# Patient Record
Sex: FEMALE | Race: WHITE | Employment: OTHER | URBAN - METROPOLITAN AREA
[De-identification: names, ages, dates, MRNs, and addresses within clinical notes are randomized per-mention and may not be internally consistent; named-entity substitution may affect disease eponyms.]

---

## 2022-12-21 RX ORDER — FAMOTIDINE 20 MG/1
20 TABLET, FILM COATED ORAL DAILY
COMMUNITY

## 2022-12-21 RX ORDER — ALBUTEROL SULFATE 90 UG/1
2 AEROSOL, METERED RESPIRATORY (INHALATION) EVERY 4 HOURS PRN
COMMUNITY
Start: 2018-02-06

## 2022-12-21 RX ORDER — POTASSIUM CHLORIDE 20 MEQ/1
20 TABLET, EXTENDED RELEASE ORAL 2 TIMES DAILY
COMMUNITY
Start: 2022-12-08 | End: 2023-12-08

## 2022-12-21 RX ORDER — LORATADINE 10 MG/1
10 TABLET ORAL PRN
COMMUNITY
Start: 2019-06-24

## 2022-12-21 RX ORDER — ATORVASTATIN CALCIUM 80 MG/1
80 TABLET, FILM COATED ORAL NIGHTLY
COMMUNITY
Start: 2022-09-02

## 2022-12-21 RX ORDER — AMLODIPINE BESYLATE 5 MG/1
5 TABLET ORAL DAILY
COMMUNITY
Start: 2022-04-07 | End: 2023-04-07

## 2022-12-21 RX ORDER — METOPROLOL SUCCINATE 25 MG/1
25 TABLET, EXTENDED RELEASE ORAL NIGHTLY
COMMUNITY
Start: 2022-04-07 | End: 2023-04-07

## 2022-12-21 RX ORDER — FEXOFENADINE HCL 180 MG/1
180 TABLET ORAL DAILY
COMMUNITY

## 2022-12-21 NOTE — PERIOP NOTE
The following records have been requested from Dr. Crystal Lowe:       [address] Records    Please send most recent pacemaker interrogation, EKG, ECHO, Stress, and cath report via fax to [phone]. Thank you!

## 2022-12-21 NOTE — PERIOP NOTE
Patient verified name, , and procedure. Type: 1a; abbreviated assessment per anesthesia guidelines  Labs per surgeon: No orders. Labs per anesthesia: None. Pt has [employer] pacemaker which was placed 22. VM left with [employer] Rep, Michael Hdz, informing that pt is schedule for an EGD at [facility] on 22, time to be determined by pre-op the day before surgery. Bo instructed to call pre-op at [phone] for time of arrival. Bo's contact #[phone]. Pacemaker added to case posting. Cardiology office note (22) in EHR if needed. EKG tracing, echo, stress, and pacemaker interrogation requested, via [address], from [facility] office (AYF:[phone]). Records to be faxed to [phone]. Charge nurse to f/u. Instructed pt that they will be notified by the Gi Lab for time of arrival. If any questions please call the GI lab at [phone]. Follow diet and prep instructions per office. May have clear liquids until 4 hours prior to time of arrival.    [place] Roxo or shower the night before and the am of surgery with antibacterial soap. No lotions, oils, powders, cologne on skin. No make up, eye make up or jewelry. Wear loose fitting comfortable, clean clothing. Must have adult present in building the entire time . Medications for the day of procedure inhaler PRN-instructed to bring DOS, Allegra PRN, Amlodipine, and Pepcid. Pt instructed to follow surgeon's instructions regarding Eliquis. The following discharge instructions reviewed with patient: medication given during procedure may cause drowsiness for several hours, therefore, do not drive or operate machinery for remainder of the day, no alcohol on the day of your procedure, resume regular diet and activity unless otherwise directed, for mild sore throat you may use Cepacol throat lozenges or warm salt water gargles as needed, call your physician for any problems or questions. Patient verbalizes understanding.

## 2022-12-22 ENCOUNTER — ANESTHESIA EVENT (OUTPATIENT)
Dept: ENDOSCOPY | Age: 82
End: 2022-12-22
Payer: MEDICARE

## 2022-12-23 ENCOUNTER — ANESTHESIA (OUTPATIENT)
Dept: ENDOSCOPY | Age: 82
End: 2022-12-23
Payer: MEDICARE

## 2022-12-23 ENCOUNTER — HOSPITAL ENCOUNTER (OUTPATIENT)
Age: 82
Setting detail: OUTPATIENT SURGERY
Discharge: HOME | End: 2022-12-23
Attending: INTERNAL MEDICINE | Admitting: INTERNAL MEDICINE
Payer: MEDICARE

## 2022-12-23 VITALS
DIASTOLIC BLOOD PRESSURE: 75 MMHG | WEIGHT: 143.96 LBS | SYSTOLIC BLOOD PRESSURE: 159 MMHG | HEART RATE: 81 BPM | HEIGHT: 67 IN | TEMPERATURE: 98.6 F | OXYGEN SATURATION: 96 % | RESPIRATION RATE: 16 BRPM | BODY MASS INDEX: 22.6 KG/M2

## 2022-12-23 PROCEDURE — 3700000000 HC ANESTHESIA ATTENDED CARE: Performed by: INTERNAL MEDICINE

## 2022-12-23 PROCEDURE — 6360000002 HC RX W HCPCS: Performed by: NURSE ANESTHETIST, CERTIFIED REGISTERED

## 2022-12-23 PROCEDURE — 2709999900 HC NON-CHARGEABLE SUPPLY: Performed by: INTERNAL MEDICINE

## 2022-12-23 PROCEDURE — 2500000003 HC RX 250 WO HCPCS: Performed by: NURSE ANESTHETIST, CERTIFIED REGISTERED

## 2022-12-23 PROCEDURE — 2580000003 HC RX 258: Performed by: INTERNAL MEDICINE

## 2022-12-23 PROCEDURE — 3609012700 HC EGD DILATION SAVORY: Performed by: INTERNAL MEDICINE

## 2022-12-23 PROCEDURE — 7100000010 HC PHASE II RECOVERY - FIRST 15 MIN: Performed by: INTERNAL MEDICINE

## 2022-12-23 PROCEDURE — 7100000011 HC PHASE II RECOVERY - ADDTL 15 MIN: Performed by: INTERNAL MEDICINE

## 2022-12-23 RX ORDER — SODIUM CHLORIDE, SODIUM LACTATE, POTASSIUM CHLORIDE, CALCIUM CHLORIDE 600; 310; 30; 20 MG/100ML; MG/100ML; MG/100ML; MG/100ML
INJECTION, SOLUTION INTRAVENOUS CONTINUOUS
Status: DISCONTINUED | OUTPATIENT
Start: 2022-12-23 | End: 2022-12-23 | Stop reason: HOSPADM

## 2022-12-23 RX ORDER — LIDOCAINE HYDROCHLORIDE 10 MG/ML
1 INJECTION, SOLUTION INFILTRATION; PERINEURAL
Status: DISCONTINUED | OUTPATIENT
Start: 2022-12-23 | End: 2022-12-23 | Stop reason: HOSPADM

## 2022-12-23 RX ORDER — PROPOFOL 10 MG/ML
INJECTION, EMULSION INTRAVENOUS PRN
Status: DISCONTINUED | OUTPATIENT
Start: 2022-12-23 | End: 2022-12-23 | Stop reason: SDUPTHER

## 2022-12-23 RX ORDER — SODIUM CHLORIDE 9 MG/ML
INJECTION, SOLUTION INTRAVENOUS PRN
Status: DISCONTINUED | OUTPATIENT
Start: 2022-12-23 | End: 2022-12-23 | Stop reason: HOSPADM

## 2022-12-23 RX ORDER — SODIUM CHLORIDE 0.9 % (FLUSH) 0.9 %
5-40 SYRINGE (ML) INJECTION EVERY 12 HOURS SCHEDULED
Status: DISCONTINUED | OUTPATIENT
Start: 2022-12-23 | End: 2022-12-23 | Stop reason: HOSPADM

## 2022-12-23 RX ORDER — LIDOCAINE HYDROCHLORIDE 20 MG/ML
INJECTION, SOLUTION EPIDURAL; INFILTRATION; INTRACAUDAL; PERINEURAL PRN
Status: DISCONTINUED | OUTPATIENT
Start: 2022-12-23 | End: 2022-12-23 | Stop reason: SDUPTHER

## 2022-12-23 RX ORDER — SODIUM CHLORIDE 0.9 % (FLUSH) 0.9 %
5-40 SYRINGE (ML) INJECTION PRN
Status: DISCONTINUED | OUTPATIENT
Start: 2022-12-23 | End: 2022-12-23 | Stop reason: HOSPADM

## 2022-12-23 RX ADMIN — LIDOCAINE HYDROCHLORIDE 40 MG: 20 INJECTION, SOLUTION EPIDURAL; INFILTRATION; INTRACAUDAL; PERINEURAL at 13:50

## 2022-12-23 RX ADMIN — PROPOFOL 140 MCG/KG/MIN: 10 INJECTION, EMULSION INTRAVENOUS at 13:52

## 2022-12-23 RX ADMIN — SODIUM CHLORIDE, POTASSIUM CHLORIDE, SODIUM LACTATE AND CALCIUM CHLORIDE: 600; 310; 30; 20 INJECTION, SOLUTION INTRAVENOUS at 12:02

## 2022-12-23 RX ADMIN — PROPOFOL 50 MG: 10 INJECTION, EMULSION INTRAVENOUS at 13:50

## 2022-12-23 ASSESSMENT — PAIN - FUNCTIONAL ASSESSMENT: PAIN_FUNCTIONAL_ASSESSMENT: 0-10

## 2022-12-23 ASSESSMENT — PAIN SCALES - GENERAL: PAINLEVEL_OUTOF10: 0

## 2022-12-23 NOTE — ANESTHESIA PRE PROCEDURE
Department of Anesthesiology  Preprocedure Note       Name:  Colon Kartik   Age:  79 y.o.  :  1940                                          MRN:  [id number]         Date:  2022      Surgeon: Katarina Membreno):  Azul Teixeira MD    Procedure: Procedure(s):  EGD ESOPHAGOGASTRODUODENOSCOPY/25. Pt has a pacemaker    Medications prior to admission:   Prior to Admission medications    Medication Sig Start Date End Date Taking? Authorizing Provider   potassium chloride (KLOR-CON M) 20 MEQ extended release tablet Take 20 mEq by mouth 2 times daily 22 Yes Historical Provider, MD   metoprolol succinate (TOPROL XL) 25 MG extended release tablet Take 25 mg by mouth nightly 22 Yes Historical Provider, MD   loratadine (CLARITIN) 10 MG tablet Take 10 mg by mouth as needed 19  Yes Historical Provider, MD   vitamin D 25 MCG (1000 UT) CAPS Take 1,000 Units by mouth daily 16  Yes Historical Provider, MD   atorvastatin (LIPITOR) 80 MG tablet Take 80 mg by mouth nightly 22  Yes Historical Provider, MD   apixaban (ELIQUIS) 5 MG TABS tablet Take 5 mg by mouth in the morning and 5 mg in the evening.  22 Yes Historical Provider, MD   amLODIPine (NORVASC) 5 MG tablet Take 5 mg by mouth daily 22 Yes Historical Provider, MD   albuterol sulfate HFA (PROVENTIL;VENTOLIN;PROAIR) 108 (90 Base) MCG/ACT inhaler Inhale 2 puffs into the lungs every 4 hours as needed 18  Yes Historical Provider, MD   fexofenadine (ALLEGRA) 180 MG tablet Take 180 mg by mouth daily    Historical Provider, MD   famotidine (PEPCID) 20 MG tablet Take 20 mg by mouth daily    Historical Provider, MD       Current medications:    Current Facility-Administered Medications   Medication Dose Route Frequency Provider Last Rate Last Admin    lidocaine 1 % injection 1 mL  1 mL IntraDERmal Once PRN Aiden Hopper MD        sodium chloride flush 0.9 % injection 5-40 mL  5-40 mL IntraVENous 2 times per day Janifer Nageotte Byron Patel MD        sodium chloride flush 0.9 % injection 5-40 mL  5-40 mL IntraVENous PRN Angel House MD        0.9 % sodium chloride infusion   IntraVENous PRN Angel House MD        lactated ringers infusion   IntraVENous Continuous Kali Mccloud  mL/hr at 12/23/22 1202 New Bag at 12/23/22 1202       Allergies: Allergies   Allergen Reactions    Honey Bee Venom Anaphylaxis    Flomax [Tamsulosin] Itching    Prilosec [Omeprazole] Itching       Problem List:  There is no problem list on file for this patient.       Past Medical History:        Diagnosis Date    Carotid stenosis     Cerebrovascular accident (CVA) due to occlusion of right anterior cerebral artery ([facility] [address]     2016 & 2019    Essential hypertension     Gastroesophageal reflux disease     Mild intermittent asthma without complication     PRN inhaler-unsure of when last used    Mixed hyperlipidemia     Mobitz type 2 second degree heart block     s/p pacemaker 5/27/22    Paroxysmal atrial fibrillation (HCC)     Prolonged emergence from general anesthesia     Recurrent UTI     S/P placement of cardiac pacemaker 05/27/2022    [facility] [facility] P[facility]       Past Surgical History:        Procedure Laterality Date    APPENDECTOMY      BACK SURGERY      x2    COLON BIOPSY      COLONOSCOPY      ESOPHAGOGASTRODUODENOSCOPY      GALLBLADDER SURGERY      HYSTERECTOMY (CERVIX STATUS UNKNOWN)      KNEE ARTHROSCOPY Right     PACEMAKER INSERTION  05/27/2022    TONSILLECTOMY         Social History:    Social History     Tobacco Use    Smoking status: Never    Smokeless tobacco: Never   Substance Use Topics    Alcohol use: Never                                Counseling given: Not Answered      Vital Signs (Current):   Vitals:    12/21/22 1352 12/23/22 1145   BP:  (!) 150/81   Pulse:  84   Resp:  16   Temp:  97.5 °F (36.4 °C)   SpO2:  98%   Weight: 150 lb (68 kg) 143 lb 15.4 oz (65.3 kg)   Height: 5' 7\" (1.702 m) BP Readings from Last 3 Encounters:   12/23/22 (!) 150/81       NPO Status: Time of last liquid consumption: 0830 (sip of water )                        Time of last solid consumption: 1900                        Date of last liquid consumption: 12/23/22                        Date of last solid food consumption: 12/22/22    BMI:   Wt Readings from Last 3 Encounters:   12/23/22 143 lb 15.4 oz (65.3 kg)     Body mass index is 22.55 kg/m². CBC: No results found for: WBC, RBC, HGB, HCT, MCV, RDW, PLT    CMP: No results found for: NA, K, CL, CO2, BUN, CREATININE, GFRAA, AGRATIO, LABGLOM, GLUCOSE, GLU, PROT, CALCIUM, BILITOT, ALKPHOS, AST, ALT    POC Tests: No results for input(s): POCGLU, POCNA, POCK, POCCL, POCBUN, POCHEMO, POCHCT in the last 72 hours.     Coags: No results found for: PROTIME, INR, APTT    HCG (If Applicable): No results found for: PREGTESTUR, PREGSERUM, HCG, HCGQUANT     ABGs: No results found for: PHART, PO2ART, MIK5IEH, PIU2ACV, BEART, M5CRXVWY     Type & Screen (If Applicable):  No results found for: LABABO, LABRH    Drug/Infectious Status (If Applicable):  No results found for: HIV, HEPCAB    COVID-19 Screening (If Applicable): No results found for: COVID19        Anesthesia Evaluation  Patient summary reviewed and Nursing notes reviewed no history of anesthetic complications:   Airway: Mallampati: II  TM distance: >3 FB   Neck ROM: full  Mouth opening: > = 3 FB   Dental: normal exam         Pulmonary:normal exam    (+) COPD:  sleep apnea (currently being workd up):                             Cardiovascular:  Exercise tolerance: no interval change,   (+) hypertension:, pacemaker: pacemaker, dysrhythmias: atrial fibrillation, hyperlipidemia               ROS comment: Symptomatic bradycardia secondary to second-degree AV block-Mobitz 2-status post permanent pacemaker ([facility])     Neuro/Psych:   (+) CVA: no interval change,             GI/Hepatic/Renal:   (+) GERD:, Endo/Other: Negative Endo/Other ROS                    Abdominal:             Vascular: negative vascular ROS. Other Findings:           Anesthesia Plan      TIVA     ASA 3       Induction: intravenous. Anesthetic plan and risks discussed with patient.                         Joelle Dhillon MD   12/23/2022

## 2022-12-23 NOTE — ANESTHESIA POSTPROCEDURE EVALUATION
Department of Anesthesiology  Postprocedure Note    Patient: Jennifer Bergman  MRN: [id number]  YOB: 1940  Date of evaluation: 12/23/2022      Procedure Summary     Date: 12/23/22 Room / Location: [facility] ENDO 01 / [facility] ENDOSCOPY    Anesthesia Start: 7296 Anesthesia Stop: 1406    Procedure: EGD DILATION (Upper GI Region) Diagnosis:       Cough, unspecified type      (Cough, unspecified type [R05.9])    Providers: Ran Sherman MD Responsible Provider: Christi Candelaria MD    Anesthesia Type: TIVA ASA Status: 3          Anesthesia Type: No value filed.     Justin Phase I:      Justin Phase II: Justin Score: 10      Anesthesia Post Evaluation    Patient location during evaluation: bedside  Patient participation: complete - patient participated  Level of consciousness: awake and alert  Pain score: 1  Airway patency: patent  Nausea & Vomiting: no vomiting  Complications: no  Cardiovascular status: hemodynamically stable  Respiratory status: acceptable  Hydration status: euvolemic

## 2022-12-23 NOTE — H&P
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 PPM       Medications:   Current Facility-Administered Medications   Medication Dose Route Frequency    lidocaine 1 % injection 1 mL  1 mL IntraDERmal Once PRN    sodium chloride flush 0.9 % injection 5-40 mL  5-40 mL IntraVENous 2 times per day    sodium chloride flush 0.9 % injection 5-40 mL  5-40 mL IntraVENous PRN    0.9 % sodium chloride infusion   IntraVENous PRN    lactated ringers infusion   IntraVENous Continuous         Objective:   Vitals:  BP (!) 150/81   Pulse 84   Temp 97.5 °F (36.4 °C)   Resp 16   Ht 5' 7\" (1.702 m)   Wt 143 lb 15.4 oz (65.3 kg)   SpO2 98%   BMI 22.55 kg/m²   Exam:  General appearance: alert, cooperative, no distress  Lungs: clear to auscultation bilaterally anteriorly  Heart: regular rate and rhythm  Abdomen: soft, non-tender. Bowel sounds normal. No masses, no organomegaly      Data Review (Labs):    No results for input(s): WBC, HGB, HCT, PLT, MCV, NA, K, CL, CO2, BUN, CREA, CA, GLU, ALB, TP, AML, INR, APTT in the last 72 hours. Invalid input(s): AP, SGOT, GPT, TBIL, DBIL, CBIL, LPSE, PTP    Plan:     EGD with possible dilation. Harley aldridge.     Guillaume Gallagher MD

## 2022-12-23 NOTE — OP NOTE
Operative Note      Patient: Tha Mancini  YOB: 1940  MRN: [id number]    Esophagogastroduodenoscopy    DATE of PROCEDURE: 12/23/2022    INDICATION:  GERD  Esophageal dysphagia  Nausea  Cough    POSTPROCEDURE DIAGNOSIS:  Bile reflux  Hiatal hernia  Esophageal ring  Esophagitis    MEDICATIONS ADMINISTERED: MAC. Further details per anesthesia note. INSTRUMENT: [id number]    PROCEDURE:  After obtaining informed consent, the patient was placed in the left lateral position and sedated. The endoscope was advanced under direct vision without difficulty to the level of the 2nd portion of the duodenum. The esophagus, stomach (including retroflexed views) and duodenum were evaluated. The patient was taken to the recovery area in stable condition. FINDINGS:  ESOPHAGUS: LA Class B esophagitis. Esophageal ring at EG junction disrupted with 54 Fr and 60 Fr Araujo bougies. Otherwise normal exam.  STOMACH: 1-2 cm hiatal hernia. Hill Grade II on retroflexion. Mild to moderate bile reflux. Otherwise normal exam.  DUODENUM: Normal    ESTIMATED BLOOD LOSS 0-minimal     COMPLICATIONS: none    Specimens obtained during procedure: none    PLAN:   1. Increase Famotidine to 40 mg PO BID  2. Restart Eliquis in 7 days  3. Acid reflux precautions  4.  Follow-up with Dr. Crow Dickens in 6-8 weeks    Juan Garnett MD

## 2022-12-23 NOTE — DISCHARGE INSTRUCTIONS
Gastrointestinal Esophagogastroduodenoscopy (EGD) - Upper Exam Discharge Instructions    1. Call Dr. Che Fong at [phone] for any problems or questions. 2. Contact the doctor's office for follow up appointment as directed. 3. Medication may cause drowsiness for several hours, therefore:  Do not drive or operate machinery for remainder of the day. No alcohol today. Do not make any important or legal decisions for 24 hours. Do not sign any legal documents for 24 hours. 5. Ordinarily, you may resume regular diet and activity after exam unless otherwise specified by your physician. 6. For mild soreness in your throat you may use Cepacol throat lozenges or warm salt-water gargles as needed. Any additional instructions:     Increase Famotidine to 40 mg PO BID  2. Restart Eliquis in 7 days  3. Acid reflux precautions  4.  Follow-up with Dr. Vivian Rich in 6-8 weeks

## (undated) DEVICE — SINGLE PORT MANIFOLD: Brand: NEPTUNE 2

## (undated) DEVICE — NEEDLE SYR 18GA L1.5IN RED PLAS HUB S STL BLNT FILL W/O

## (undated) DEVICE — AIRLIFE™ OXYGEN TUBING 7 FEET (2.1 M) CRUSH RESISTANT OXYGEN TUBING, VINYL TIPPED: Brand: AIRLIFE™

## (undated) DEVICE — CONNECTOR TBNG OD5-7MM O2 END DISP

## (undated) DEVICE — YANKAUER,BULB TIP,W/O VENT,RIGID,STERILE: Brand: MEDLINE

## (undated) DEVICE — SYRINGE MED 3ML CLR PLAS STD N CTRL LUERLOCK TIP DISP

## (undated) DEVICE — LUBE JELLY FOIL PACK 1.4 OZ: Brand: MEDLINE INDUSTRIES, INC.

## (undated) DEVICE — SYRINGE, LUER SLIP, STERILE, 60ML: Brand: MEDLINE

## (undated) DEVICE — CANNULA NSL ORAL AD FOR CAPNOFLEX CO2 O2 AIRLFE

## (undated) DEVICE — KENDALL RADIOLUCENT FOAM MONITORING ELECTRODE RECTANGULAR SHAPE: Brand: KENDALL

## (undated) DEVICE — SYRINGE MED 10ML LUERLOCK TIP W/O SFTY DISP

## (undated) DEVICE — GAUZE,SPONGE,4"X4",12PLY,WOVEN,NS,LF: Brand: MEDLINE

## (undated) DEVICE — BLOCK BITE AD 60FR W/ VELC STRP ADDRESSES MOST PT AND